# Patient Record
Sex: FEMALE | Race: WHITE | Employment: OTHER | URBAN - METROPOLITAN AREA
[De-identification: names, ages, dates, MRNs, and addresses within clinical notes are randomized per-mention and may not be internally consistent; named-entity substitution may affect disease eponyms.]

---

## 2022-12-28 ENCOUNTER — OFFICE VISIT (OUTPATIENT)
Dept: ENT CLINIC | Age: 87
End: 2022-12-28
Payer: MEDICARE

## 2022-12-28 VITALS — RESPIRATION RATE: 17 BRPM | BODY MASS INDEX: 24.11 KG/M2 | HEIGHT: 62 IN | WEIGHT: 131 LBS

## 2022-12-28 DIAGNOSIS — H92.03 OTALGIA OF BOTH EARS: ICD-10-CM

## 2022-12-28 DIAGNOSIS — M26.629 TMJ SYNDROME: Primary | ICD-10-CM

## 2022-12-28 PROCEDURE — 1036F TOBACCO NON-USER: CPT | Performed by: STUDENT IN AN ORGANIZED HEALTH CARE EDUCATION/TRAINING PROGRAM

## 2022-12-28 PROCEDURE — 1123F ACP DISCUSS/DSCN MKR DOCD: CPT | Performed by: STUDENT IN AN ORGANIZED HEALTH CARE EDUCATION/TRAINING PROGRAM

## 2022-12-28 PROCEDURE — G8420 CALC BMI NORM PARAMETERS: HCPCS | Performed by: STUDENT IN AN ORGANIZED HEALTH CARE EDUCATION/TRAINING PROGRAM

## 2022-12-28 PROCEDURE — 1090F PRES/ABSN URINE INCON ASSESS: CPT | Performed by: STUDENT IN AN ORGANIZED HEALTH CARE EDUCATION/TRAINING PROGRAM

## 2022-12-28 PROCEDURE — 92504 EAR MICROSCOPY EXAMINATION: CPT | Performed by: STUDENT IN AN ORGANIZED HEALTH CARE EDUCATION/TRAINING PROGRAM

## 2022-12-28 PROCEDURE — G8484 FLU IMMUNIZE NO ADMIN: HCPCS | Performed by: STUDENT IN AN ORGANIZED HEALTH CARE EDUCATION/TRAINING PROGRAM

## 2022-12-28 PROCEDURE — 99204 OFFICE O/P NEW MOD 45 MIN: CPT | Performed by: STUDENT IN AN ORGANIZED HEALTH CARE EDUCATION/TRAINING PROGRAM

## 2022-12-28 PROCEDURE — G8427 DOCREV CUR MEDS BY ELIG CLIN: HCPCS | Performed by: STUDENT IN AN ORGANIZED HEALTH CARE EDUCATION/TRAINING PROGRAM

## 2022-12-28 RX ORDER — COVID-19 ANTIGEN TEST
KIT MISCELLANEOUS DAILY PRN
COMMUNITY

## 2022-12-28 RX ORDER — SIMVASTATIN 40 MG
40 TABLET ORAL DAILY
COMMUNITY
Start: 2022-08-23 | End: 2023-08-23

## 2022-12-28 RX ORDER — FLUTICASONE PROPIONATE 50 MCG
SPRAY, SUSPENSION (ML) NASAL
COMMUNITY
Start: 2022-12-22

## 2022-12-28 RX ORDER — AMLODIPINE BESYLATE 5 MG/1
5 TABLET ORAL DAILY
COMMUNITY
Start: 2022-08-23 | End: 2023-08-23

## 2022-12-28 RX ORDER — PSEUDOEPHEDRINE HCL 30 MG
100 TABLET ORAL DAILY
COMMUNITY
Start: 2022-08-23 | End: 2023-08-23

## 2022-12-28 RX ORDER — UBIDECARENONE 100 MG
100 CAPSULE ORAL DAILY
COMMUNITY

## 2022-12-28 RX ORDER — LEVOTHYROXINE SODIUM 0.05 MG/1
TABLET ORAL
COMMUNITY
Start: 2022-10-20

## 2022-12-28 RX ORDER — DICLOFENAC SODIUM 75 MG/1
TABLET, DELAYED RELEASE ORAL
COMMUNITY
Start: 2022-11-28

## 2022-12-28 RX ORDER — LORATADINE 10 MG/1
10 TABLET ORAL DAILY
COMMUNITY
Start: 2022-08-23 | End: 2023-08-23

## 2022-12-28 RX ORDER — NITROGLYCERIN 0.4 MG/1
0.4 TABLET SUBLINGUAL EVERY 5 MIN PRN
COMMUNITY
Start: 2021-09-27

## 2022-12-28 RX ORDER — CYCLOBENZAPRINE HCL 5 MG
TABLET ORAL
COMMUNITY
Start: 2022-12-13

## 2022-12-28 RX ORDER — DENOSUMAB 60 MG/ML
60 INJECTION SUBCUTANEOUS
COMMUNITY

## 2022-12-28 RX ORDER — ASPIRIN 325 MG
325 TABLET ORAL DAILY
COMMUNITY

## 2022-12-28 ASSESSMENT — ENCOUNTER SYMPTOMS
ABDOMINAL DISTENTION: 0
EYE DISCHARGE: 0
APNEA: 0
COLOR CHANGE: 0

## 2022-12-28 NOTE — PROGRESS NOTES
HPI:  Hamlet Lopez is a 80 y.o. female seen New    Chief Complaint   Patient presents with    Ear Problem     Patient presents today with c/o bilateral earaches and headaches that come and go , these have been present for the last few months. Patient denies dizziness and drainage. 26-year-old female seen as a new patient referral evaluation with bilateral otalgia. This has been somewhat associated with recent musculoskeletal neck pain and headaches for which she has been evaluated and treated with physical therapy for the last couple months with moderate benefit. She is continue to have intermittent earaches described as fluctuating between the left and right side occurring almost daily. This will be worse when she is either lying down or when she first gets up in the mornings from bed. She notices the pain to be deep within the ears and just in front of her ears. It does not seem to radiate. It does have some associated feeling as if it is associated with her chronic neck pain. She denies any hearing loss tinnitus otorrhea dizziness or vertigo. Past Medical History, Past Surgical History, Family history, Social History, and Medications were all reviewed with the patient today and updated as necessary. No Known Allergies    There is no problem list on file for this patient.       Current Outpatient Medications   Medication Sig    simvastatin (ZOCOR) 40 MG tablet Take 40 mg by mouth daily    loratadine (CLARITIN) 10 MG tablet Take 10 mg by mouth daily    Naproxen Sodium 220 MG CAPS Take by mouth daily as needed    nitroGLYCERIN (NITROSTAT) 0.4 MG SL tablet Place 0.4 mg under the tongue every 5 minutes as needed    Acetaminophen 325 MG CAPS Take by mouth    amLODIPine (NORVASC) 5 MG tablet Take 5 mg by mouth daily    aspirin 325 MG tablet Take 325 mg by mouth daily    calcium carbonate 1500 (600 Ca) MG TABS tablet Take 600 mg by mouth 2 times daily (with meals)    coenzyme Q10 100 MG CAPS capsule Take 100 mg by mouth daily    cyanocobalamin 1000 MCG tablet Take 1,000 mcg by mouth daily    cyclobenzaprine (FLEXERIL) 5 MG tablet     denosumab (PROLIA) 60 MG/ML SOSY SC injection Inject 60 mg into the skin    diclofenac (VOLTAREN) 75 MG EC tablet     docusate (COLACE, DULCOLAX) 100 MG CAPS Take 100 mg by mouth daily    fluticasone (FLONASE) 50 MCG/ACT nasal spray SHAKE LIQUID AND USE 1 SPRAY IN EACH NOSTRIL DAILY    levothyroxine (SYNTHROID) 50 MCG tablet TAKE 1 TABLET BY MOUTH ONCE DAILY     No current facility-administered medications for this visit. History reviewed. No pertinent past medical history. History reviewed. No pertinent surgical history. Social History     Tobacco Use    Smoking status: Never    Smokeless tobacco: Never   Substance Use Topics    Alcohol use: Never       History reviewed. No pertinent family history. ROS:    Review of Systems   Constitutional:  Negative for activity change. HENT:  Positive for ear pain. Negative for congestion. Eyes:  Negative for discharge. Respiratory:  Negative for apnea. Cardiovascular:  Negative for chest pain. Gastrointestinal:  Negative for abdominal distention. Endocrine: Negative for cold intolerance. Genitourinary:  Negative for difficulty urinating. Musculoskeletal:  Positive for neck pain. Negative for arthralgias. Skin:  Negative for color change. Allergic/Immunologic: Negative for environmental allergies. Neurological:  Positive for headaches. Negative for dizziness. Hematological:  Negative for adenopathy. Psychiatric/Behavioral:  Negative for agitation. PHYSICAL EXAM:    Resp 17   Ht 5' 2\" (1.575 m)   Wt 131 lb (59.4 kg)   BMI 23.96 kg/m²     Physical Exam  Vitals and nursing note reviewed. Constitutional:       Appearance: Normal appearance. HENT:      Head: Normocephalic and atraumatic. Right Ear: Tympanic membrane, ear canal and external ear normal. No middle ear effusion. There is no impacted cerumen. Tympanic membrane is not scarred, perforated, erythematous or retracted. Left Ear: Tympanic membrane, ear canal and external ear normal.  No middle ear effusion. There is no impacted cerumen. Tympanic membrane is not scarred, perforated, erythematous or retracted. Ears:      Comments: Binocular microscopy revealed:    Bilateral EACs patent without edema erythema or lesions  Bilateral TMs intact with no perforations retractions or middle ear effusions       Nose: Nose normal. No congestion or rhinorrhea. Mouth/Throat:      Mouth: Mucous membranes are moist.      Pharynx: Oropharynx is clear. No oropharyngeal exudate or posterior oropharyngeal erythema. Eyes:      General: No scleral icterus. Pulmonary:      Effort: Pulmonary effort is normal.   Musculoskeletal:      Cervical back: Normal range of motion and neck supple. No rigidity. Lymphadenopathy:      Cervical: No cervical adenopathy. Skin:     General: Skin is warm and dry. Neurological:      Mental Status: She is alert and oriented to person, place, and time. Psychiatric:         Mood and Affect: Mood normal.         Behavior: Behavior normal.              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 Otalgia of both ears  H92.03           Patient was reassured of a completely normal examination ears with the bilateral TMs intact no perforations retractions or middle ear effusions. She does have pinpoint tenderness over the location of the bilateral TMJs during mobility of the mandible. There is crepitus also noted on palpation. We have discussed pathophysiology of TMJ syndrome and its likely association with her earaches and have discussed conservative treatment options. has otalgia which appears secondary to TMJ dysfunction. Both of her ears looked completely healthy on my exam today w/ no other pathology.  For now, I recommend conservative measures w/ soft diet, no gum chewing, warm compresses and OTC anti-inflammatories. If conservatives measures fail then they will follow-up with dentist for obtaining a dental guard or to discuss other treatment options.       Marcela Saenz DO  12/28/2022